# Patient Record
(demographics unavailable — no encounter records)

---

## 2017-11-27 NOTE — XR
EXAMINATION TYPE: XR chest 2V

 

DATE OF EXAM: 11/27/2017

 

COMPARISON: None

 

HISTORY: 48-year-old female with chest pain

 

TECHNIQUE:  PA and lateral views

 

FINDINGS:  

Heart is normal size. Aorta and pulmonary vasculature within normal limits. Peribronchial cuffing is 
noted. Strandy atelectasis in the lower lungs. No consolidation or pleural effusion. ACDF hardware.

 

 

IMPRESSION:  

Mild peribronchial cuffing. Findings may reflect bronchitis or chronic asthma. Strandy atelectasis in
 the lower lungs. Otherwise, no acute process seen.

## 2017-11-27 NOTE — ED
General Adult HPI





- General


Chief complaint: Chest Pain


Stated complaint: chest pain


Time Seen by Provider: 11/27/17 13:55


Source: patient, RN notes reviewed


Mode of arrival: wheelchair


Limitations: no limitations





- History of Present Illness


Initial comments: 





48-year-old female presenting for evaluation of right upper chest pain.  Pain 

is sharp in nature, worse with movement.  Pain has been constant since 

yesterday.  Patient denies vomiting or diaphoresis.  Patient states she does 

have similar pain in her back.  Past medical history of hypertension and 

hypercholesterolemia.  Patient denies shortness of breath or difficulty 

breathing, denies any radiating symptoms.  





- Related Data


 Home Medications











 Medication  Instructions  Recorded  Confirmed


 


Cyanocobalamin [Vitamin B-12] 500 mcg PO DAILY 11/27/17 11/27/17


 


Levothyroxine Sodium [Synthroid] 88 mcg PO DAILY 11/27/17 11/27/17











 Allergies











Allergy/AdvReac Type Severity Reaction Status Date / Time


 


codeine Allergy  Vomiting,ra Verified 11/27/17 14:27





   sh  


 


sulfamethoxazole Allergy  Nausea & Verified 11/27/17 14:27





[From Bactrim]   Vomiting &  





   Diarrhea  


 


trimethoprim [From Bactrim] Allergy  Nausea & Verified 11/27/17 14:27





   Vomiting &  





   Diarrhea  














Review of Systems


ROS Statement: 


Those systems with pertinent positive or pertinent negative responses have been 

documented in the HPI.





ROS Other: All systems not noted in ROS Statement are negative.





Past Medical History


Past Medical History: GERD/Reflux, Thyroid Disorder


Additional Past Medical History / Comment(s): IBS/diverticulitis, fatty liver, 

uterine fibroids, hashimoto's thyroid,


History of Any Multi-Drug Resistant Organisms: MRSA


Date of last positivie culture/infection: 10/17/2013


MDRO Source:: forehead


Past Surgical History: Back Surgery, Breast Surgery, Cholecystectomy, 

Hysterectomy, Tonsillectomy, Tubal Ligation, Uterine Ablation


Additional Past Surgical History / Comment(s): cervical fusion, breast reduction

, fatty tumors removed from axilla/skin.


Past Anesthesia/Blood Transfusion Reactions: Motion Sickness, Postoperative 

Nausea & Vomiting (PONV)


Past Psychological History: No Psychological Hx Reported


Smoking Status: Never smoker


Past Alcohol Use History: None Reported


Past Drug Use History: None Reported





- Past Family History


  ** Mother


Family Medical History: Cancer





General Exam


Limitations: no limitations


General appearance: alert, in no apparent distress


Head exam: Present: atraumatic, normocephalic


Eye exam: Present: normal appearance, PERRL


ENT exam: Present: normal exam


Neck exam: Present: normal inspection.  Absent: tenderness, meningismus


Respiratory exam: Present: normal lung sounds bilaterally.  Absent: respiratory 

distress


Cardiovascular Exam: Present: regular rate, normal rhythm


GI/Abdominal exam: Present: soft.  Absent: distended, tenderness


Extremities exam: Present: normal inspection, normal capillary refill.  Absent: 

pedal edema


Back exam: Present: normal inspection, full ROM


Neurological exam: Present: alert, oriented X3, CN II-XII intact.  Absent: 

motor sensory deficit


Psychiatric exam: Present: normal affect, normal mood


Skin exam: Present: warm, dry, intact.  Absent: cyanosis, diaphoretic





Course


 Vital Signs











  11/27/17 11/27/17 11/27/17





  13:51 14:41 15:11


 


Temperature 98.4 F  


 


Pulse Rate 75 74 66


 


Respiratory 18  





Rate   


 


Blood Pressure 157/85 144/89 147/89


 


O2 Sat by Pulse 99 97 99





Oximetry   














EKG Findings





- EKG Comments:


EKG Findings:: EKG shows normal sinus rhythm, ventricular rate 74, CA interval 

160, QRS duration 98, , no signs of ischemia





Medical Decision Making





- Medical Decision Making





40-year-old female presenting with 2 day history of right upper chest pain.  

Atypical in nature.  Worse with movement.  Pain is reproducible on examination.

  EKG is nonischemic.  Chest x-ray shows no focal pneumonia or acute findings, 

there is mild peribronchial cuffing, patient does admit to having a two-week 

history of minimal cough.  No history of asthma.  Laboratory studies including 

CBC, CMP, cardiac enzymes, and d-dimer are unremarkable.  Patient's chest pain 

is not concerning for cardiac causes.  She will follow-up with her primary care 

physician.





- Lab Data


Result diagrams: 


 11/27/17 14:27





 11/27/17 14:27


 Lab Results











  11/27/17 11/27/17 11/27/17 Range/Units





  14:27 14:27 14:27 


 


WBC   8.2   (3.8-10.6)  k/uL


 


RBC   4.61   (3.80-5.40)  m/uL


 


Hgb   14.5   (11.4-16.0)  gm/dL


 


Hct   43.0   (34.0-46.0)  %


 


MCV   93.3   (80.0-100.0)  fL


 


MCH   31.4   (25.0-35.0)  pg


 


MCHC   33.7   (31.0-37.0)  g/dL


 


RDW   13.7   (11.5-15.5)  %


 


Plt Count   348   (150-450)  k/uL


 


Neutrophils %   59   %


 


Lymphocytes %   32   %


 


Monocytes %   5   %


 


Eosinophils %   2   %


 


Basophils %   1   %


 


Neutrophils #   4.8   (1.3-7.7)  k/uL


 


Lymphocytes #   2.6   (1.0-4.8)  k/uL


 


Monocytes #   0.4   (0-1.0)  k/uL


 


Eosinophils #   0.2   (0-0.7)  k/uL


 


Basophils #   0.1   (0-0.2)  k/uL


 


PT     (9.0-12.0)  sec


 


INR     (<1.2)  


 


APTT     (22.0-30.0)  sec


 


D-Dimer     (<0.60)  mg/L FEU


 


Sodium    139  (137-145)  mmol/L


 


Potassium    4.2  (3.5-5.1)  mmol/L


 


Chloride    106  ()  mmol/L


 


Carbon Dioxide    22  (22-30)  mmol/L


 


Anion Gap    11  mmol/L


 


BUN    15  (7-17)  mg/dL


 


Creatinine    0.69  (0.52-1.04)  mg/dL


 


Est GFR (MDRD) Af Amer    >60  (>60 ml/min/1.73 sqM)  


 


Est GFR (MDRD) Non-Af    >60  (>60 ml/min/1.73 sqM)  


 


Glucose    84  (74-99)  mg/dL


 


Calcium    9.8  (8.4-10.2)  mg/dL


 


Magnesium    1.9  (1.6-2.3)  mg/dL


 


Total Bilirubin    0.4  (0.2-1.3)  mg/dL


 


AST    29  (14-36)  U/L


 


ALT    53 H  (9-52)  U/L


 


Alkaline Phosphatase    79  ()  U/L


 


Total Creatine Kinase  120    ()  U/L


 


CK-MB (CK-2)  1.8    (0.0-2.4)  ng/mL


 


CK-MB (CK-2) Rel Index  1.5    


 


Troponin I  <0.012    (0.000-0.034)  ng/mL


 


NT-Pro-B Natriuret Pep     pg/mL


 


Total Protein    7.7  (6.3-8.2)  g/dL


 


Albumin    4.8  (3.5-5.0)  g/dL














  11/27/17 11/27/17 Range/Units





  14:27 14:27 


 


WBC    (3.8-10.6)  k/uL


 


RBC    (3.80-5.40)  m/uL


 


Hgb    (11.4-16.0)  gm/dL


 


Hct    (34.0-46.0)  %


 


MCV    (80.0-100.0)  fL


 


MCH    (25.0-35.0)  pg


 


MCHC    (31.0-37.0)  g/dL


 


RDW    (11.5-15.5)  %


 


Plt Count    (150-450)  k/uL


 


Neutrophils %    %


 


Lymphocytes %    %


 


Monocytes %    %


 


Eosinophils %    %


 


Basophils %    %


 


Neutrophils #    (1.3-7.7)  k/uL


 


Lymphocytes #    (1.0-4.8)  k/uL


 


Monocytes #    (0-1.0)  k/uL


 


Eosinophils #    (0-0.7)  k/uL


 


Basophils #    (0-0.2)  k/uL


 


PT  10.7   (9.0-12.0)  sec


 


INR  1.1   (<1.2)  


 


APTT  23.4   (22.0-30.0)  sec


 


D-Dimer  0.28   (<0.60)  mg/L FEU


 


Sodium    (137-145)  mmol/L


 


Potassium    (3.5-5.1)  mmol/L


 


Chloride    ()  mmol/L


 


Carbon Dioxide    (22-30)  mmol/L


 


Anion Gap    mmol/L


 


BUN    (7-17)  mg/dL


 


Creatinine    (0.52-1.04)  mg/dL


 


Est GFR (MDRD) Af Amer    (>60 ml/min/1.73 sqM)  


 


Est GFR (MDRD) Non-Af    (>60 ml/min/1.73 sqM)  


 


Glucose    (74-99)  mg/dL


 


Calcium    (8.4-10.2)  mg/dL


 


Magnesium    (1.6-2.3)  mg/dL


 


Total Bilirubin    (0.2-1.3)  mg/dL


 


AST    (14-36)  U/L


 


ALT    (9-52)  U/L


 


Alkaline Phosphatase    ()  U/L


 


Total Creatine Kinase    ()  U/L


 


CK-MB (CK-2)    (0.0-2.4)  ng/mL


 


CK-MB (CK-2) Rel Index    


 


Troponin I    (0.000-0.034)  ng/mL


 


NT-Pro-B Natriuret Pep   28  pg/mL


 


Total Protein    (6.3-8.2)  g/dL


 


Albumin    (3.5-5.0)  g/dL














Disposition


Clinical Impression: 


 Chest pain, Costalchondritis





Disposition: HOME SELF-CARE


Condition: Good


Instructions:  Chest Pain (ED), Costochondritis (ED)


Referrals: 


Arron Hester MD [Primary Care Provider] - 1-2 days


Time of Disposition: 15:58

## 2018-09-07 NOTE — P.GSHP
History of Present Illness


H&P Date: 18





The patient is a 49-year-old white female who presents for evaluation of an 

abnormal mammogram and ultrasound.  Additionally she states that she has felt 

some fullness in her right posterior breast for approximately 6 months.  The 

area is not painful but has increased in size.  She has had prior lipomas 

removed and thought this may represent a lipoma.  On her mammogram which was 

performed on  she was noted to have a 3.8 x 5.3 cm rounded density in 

the posterior described as right axillary region.  This appears to have been 

present previously but has increased in size.  An ultrasound was recommended.  

On ultrasound a discordant cystic lesion was identified which was 0.4 x 0.5 cm 

and attempt a cyst aspiration was suggested.  No reference was made at that 

time to the larger lesion posterior in the breast.  The radiographs have been 

reviewed with Dr. Munguia and it is his feeling that the cystic lesion is benign 

and can be followed with a repeat ultrasound.  However the larger area in the 

posterior breast should have further radiographic evaluation.  It is 

recommended she undergo lateral and spot compression views with an a XCCL 

evaluation of the lesion in the posterior right breast.  The patient denies any 

nipple discharge or skin changes.  The patient has had bilateral cyst 

aspirations in the past.  The patient is also had a bilateral breast reduction 

in .


The patient drinks one cup of coffee/day, she does not drink pop, she is not a 

smoker and does not live with a smoker.  She does not eat large quantities of 

chocolate.


Family history:


1. mother: pancreatic cancer


2. sister: ovarian cancer


3.  maternal grandmother: lung cancer, also uterine cancer


4.  maternal aunt: hodgkins, rectal, cervical and uterine


5.  maternal great aunts and uncles: 16 all 16  of cancer,(liver, brain)








Hormonal History:


menarche: 13


pregnancy: 2, 2 children, breast fed: yes, age at first birth: 19


hysterectomy: at 44 left ovaries, done for bleeding/pain


BCP: 1 year


hormones: no





Past Surgical History:


1.  gallbladder


2.  breast reduction


3.  cervical disc fusion


4. hysterectomy


5.  tubal


6.  tonsillectomy


7.  lipoma's














Past Medical History:


1.  boarderline diabetic


2. HTN


3.  High cholesterol


4.  Diverticular disease


5.  Irritable Bowel syndrome


6.  Vitamin D deficiency





Social HIstory:


smoke: none


alcohol: none


drugs: none











 








- Constitutional


Constitutional: Denies chills, Denies fever





- EENT


Eyes: bilateral blurred vision (itchy eyes, and blurry at times), denies pain


Ears: bilateral: ear discharge (drain at times), tinnitus (occasional)


Ears, nose, mouth and throat: Reports sore throat, Denies headache





- Breasts


Breasts: bilateral: as per HPI





- Cardiovascular


Cardiovascular: Reports high blood pressure





- Respiratory


Respiratory: Denies cough, Denies 7





- Gastrointestinal


Gastrointestinal: Reports as per HPI





- Genitourinary (Female)


Genitourinary: Denies dysuria, Denies hematuria





- Menstruation


Menstruation: Reports post hysterectomy





- Musculoskeletal


Musculoskeletal: Reports myalgias





- Integumentary


Integumentary: Reports rash, Denies pruritus





- Neurological


Neurological: Reports numbness, Denies weakness





- Psychiatric


Psychiatric: Denies anxiety, Denies depression





- Endocrine


Comment: 





hypothyroid, autoimmune deficiency





- Hematologic/Lymphatic


Comment: 





ibuprofen prn





- Allergic/Immunologic


Comment: 





as noted in allergies


Allergic/Immunologic: Reports as per HPI





Past Medical History


Past Medical History: GERD/Reflux, Hyperlipidemia, Hypertension, Thyroid 

Disorder


Additional Past Medical History / Comment(s): IBS/diverticulitis, fatty liver, 

uterine fibroids, hashimoto's thyroid, borderline Type 2 DM,


History of Any Multi-Drug Resistant Organisms: MRSA


Date of last positivie culture/infection: 10/17/2013


MDRO Source:: forehead


Past Surgical History: Back Surgery, Breast Surgery, Cholecystectomy, 

Hysterectomy, Tonsillectomy, Tubal Ligation, Uterine Ablation


Additional Past Surgical History / Comment(s): cervical fusion 2008, 

breast reduction , fatty tumors removed from right axilla, left forearm, 

left anterior upper leg x2, right posterior upper leg.


Past Anesthesia/Blood Transfusion Reactions: Motion Sickness, Postoperative 

Nausea & Vomiting (PONV)


Past Psychological History: No Psychological Hx Reported


Smoking Status: Never smoker


Past Alcohol Use History: None Reported


Past Drug Use History: None Reported





- Past Family History


  ** Sister(s)


Family Medical History: Cancer, Fibromyalgia


Additional Family Medical History / Comment(s): first sister: ovarian cancer 2018.  second sister: fibromyalgia 





  ** Father


Family Medical History: Myocardial Infarction (MI)


Additional Family Medical History / Comment(s):  at age 49





  ** Mother


Family Medical History: Cancer


Additional Family Medical History / Comment(s): pancreatic cancer  at 

age 59





Medications and Allergies


 Home Medications











 Medication  Instructions  Recorded  Confirmed  Type


 


Atorvastatin [Lipitor] 10 mg PO HS 18 History


 


Cyanocobalamin (Vitamin B-12) 5,000 mcg PO DAILY 18 History





[Vitamin B-12]    


 


Hydrochlorothiazide 25 mg PO DAILY 18 History


 


Levothyroxine Sodium [Synthroid] 100 mcg PO DAILY 18 History


 


Lisinopril [Zestril] 5 mg PO DAILY 18 History


 


Potassium Chloride [Klor-Con 20] 20 meq PO DAILY 18 History


 


Ranitidine HCl 300 mg PO HS 18 History











 Allergies











Allergy/AdvReac Type Severity Reaction Status Date / Time


 


codeine Allergy  Vomiting,ra Verified 18 09:10





   sh  


 


Androgenic Anabolic Steroid AdvReac  Nausea & Unverified 18 09:10





   Vomiting,  





   Swelling  


 


sulfamethoxazole AdvReac  Nausea & Verified 18 09:10





[From Bactrim]   Vomiting &  





   Diarrhea  


 


trimethoprim [From Bactrim] AdvReac  Nausea & Verified 18 09:10





   Vomiting &  





   Diarrhea  














Surgical - Exam


 Vital Signs











Temp Pulse Resp BP Pulse Ox


 


 98.7 F   78   14   107/73   96 


 


 18 09:12  18 09:12  18 09:12  18 09:12  18 09:12














- General


obese





- Eyes


normal ocular movement, no icteric





- ENT


no hearing loss, no congestion





- Neck


no masses, trachea midline





- Respiratory


normal respiratory effort, clear to auscultation





- Cardiovascular


Rhythm: regular


Heart Sounds: normal: S1, S2





- Abdomen


Abdomen: soft, non tender, no guarding, no rigid, no rebound





- Neurologic


no disoriented, no combative





- Musculoskeletal


normal gait, normal posture





- Psychiatric


oriented to time, oriented to person, oriented to place, speech is normal, 

memory intact





breast: right breast multipositional exam, there is a fullness in the 12:00 

position of the breast extending laterally, no nipple discharge or changes, the 

patient has well-healed scars from prior breast reduction


Right axilla: Scar from prior biopsy removal of lipoma no adenopathy of concern


Left breast: Multi-positional exam fibrocystic changes, scarring from prior 

breast reduction, no dominant masses or nodules of concern


Left axilla: No adenopathy of concern





Results





Mammogram and ultrasound have been reviewed with Dr. Mireles.  On the mammogram 

there was noted to be a 3.5 x 5.3 cm somewhat rounded density in the posterior 

right breast this has increased in size and ultrasound was performed no lesion 

was seen on ultrasound concordant with this but a small cyst was seen at the 9 o

'clock position of the right breast and attempt a cyst aspiration was 

recommended initially, however after review with Dr. Mireles he felt that the 

cystic lesion was non-worrisome and that this should be followed with a 6 month 

ultrasound, the area of concern in the right breast initially we discussed 

compression views to see if this would splay out.  Upon examination of the 

patient there is a palpable area corresponding to the mammographic abnormality 

which is somewhat diffuse and therefore stereo biopsy will be attempted.





Assessment and Plan


Assessment: 





Impression:


1.  Palpable abnormality right breast


2.  Radiographic abnormality right breast


3.  Ultrasound cystic lesion right breast discordant with that seen on 

mammogram and palpable areas


3.  Status post bilateral breast reduction


4.  Hypertension


5.  High cholesterol


6.  Cervical disc disease


7.  Autoimmune disease


8.  Vitamin D deficiency


9.  Hypothyroidism


10.  Irritable bowel syndrome


11.  History of diverticular disease


12.  Borderline diabetic


13.  Strong family history of cancer


Plan:


1.  Attempted stereotactic biopsy of area of concern in the right breast


2.  Six-month follow-up ultrasound for cystic lesion in the right breast


3.  Have discussed with the patient that caffeine may aggravate fibrocystic 

disease and she is going to stop her coffee


4.  Medical management of medical problems


5.  Consider genetic consultation secondary to strong family history of cancer, 

patient will first get the area of concern in the right breast resolved


CC: Dr. Molina, Dr. Guallpa

## 2018-09-27 NOTE — MM
"              After Visit Summary   5/5/2018    Darlin Quiroz    MRN: [id number]           Patient Information     Date Of Birth          1967        Visit Information        Provider Department      5/5/2018 6:40 PM Sophy Shi DO [facility]        Today's Diagnoses     Respiratory infection    -  1    Throat pain        Strep throat exposure        Urinary incontinence, unspecified type          Care Instructions    If any fevers (a temperature of 100.5 or above) persists after 48 hours on the antibiotic, see your primary provider.             Follow-ups after your visit        Who to contact     If you have questions or need follow up information about today's clinic visit or your schedule please contact [facility] directly at [phone].  Normal or non-critical lab and imaging results will be communicated to you by [employer], letter or phone within 4 business days after the clinic has received the results. If you do not hear from us within 7 days, please contact the clinic through [employer] or phone. If you have a critical or abnormal lab result, we will notify you by phone as soon as possible.  Submit refill requests through [employer] or call your pharmacy and they will forward the refill request to us. Please allow 3 business days for your refill to be completed.          Additional Information About Your Visit        MyChart Information     [employer] lets you send messages to your doctor, view your test results, renew your prescriptions, schedule appointments and more. To sign up, go to [email or web address] . Click on \"Log in\" on the left side of the screen, which will take you to the Welcome page. Then click on \"Sign up Now\" on the right side of the page.     You will be asked to enter the access code listed below, as well as some personal information. Please follow the directions to create your username and password.     Your access code is: " Reason for exam: additional evaluation requested from prior study.

Last mammogram was performed 1 month ago.



MG 3D Diag Mammo W/Cad RT

Spot compression CC, spot compression MLO, LM, and XCCL view(s) were taken of 
the 

right breast.

Prior study comparison: August 31, 2018, mammogram.  August 27, 2018, 
mammogram.  

May 24, 2017, mammogram.

There are scattered fibroglandular densities.  Post mammoplasty changes are 

present. Patient came with outside imaging from [facility] where 

ultrasound demonstrated a5mm probable cyst at 9 o'clock for which aspiration 
was 

suggested. The case was subsequently reviewed with the surgeon and it was 
determined that this does not 

correspond to the upper outer quadrant global asymmetry. 3D lateral shows 
possible

9mm nodularity superimposed, not seen on MLO.



These results were verbally communicated with the patient and result sheet 
given 

to the patient on 9/27/18.





ASSESSMENT: Incomplete: need additional imaging evaluation, BI-RAD 0



RECOMMENDATION:

Ultrasound of the right breast.

(9-3 o'clock)

GARIMA "[email or web address]  Expires: 8/3/2018  7:59 PM     Your access code will  in 90 days. If you need help or a new code, please call your [place] clinic or [phone].        Care EveryWhere ID     This is your Care EveryWhere ID. This could be used by other organizations to access your [place] medical records  [id number]        Your Vitals Were     Pulse Temperature Height Last Period Pulse Oximetry BMI (Body Mass Index)    117 103.2  F (39.6  C) (Oral) 5' 3.5\" (1.613 m) 04/10/2017 (Exact Date) 98% 33.83 kg/m2       Blood Pressure from Last 3 Encounters:   18 (!) 162/96    Weight from Last 3 Encounters:   18 194 lb (88 kg)              We Performed the Following     *UA reflex to Microscopic and Culture ([place] and [facility] (except [place] and [place])     Beta strep group A culture     CBC with platelets and differential     Influenza A/B antigen     Strep, Rapid Screen     Urine Microscopic          Today's Medication Changes          These changes are accurate as of 18  7:59 PM.  If you have any questions, ask your nurse or doctor.               Start taking these medicines.        Dose/Directions    azithromycin 250 MG tablet   Commonly known as:  ZITHROMAX   Used for:  Respiratory infection   Started by:  Sophy Shi, DO        Two tablets first day, then one tablet daily for four days.   Quantity:  6 tablet   Refills:  0            Where to get your medicines      These medications were sent to [id number] - [place], [place] - [address] AT [facility] [address]  [facility] [address], [place] [address]    Hours:  24-hours Phone:  [phone]     azithromycin 250 MG tablet                Primary Care Provider Fax #    Physician No Ref-Primary [phone]       No address on file        Equal Access to Services     RAFIQ AREVALO AH: Shaina Dyer, waaxda luqadaha, qaybta kaalmada adeegyada, cory pinedo " ah. So [facility] [phone].    ATENCIÓN: Si habla delores, tiene a sullivan disposición servicios gratuitos de asistencia lingüística. Filomena al [phone].    We comply with applicable federal civil rights laws and [place] laws. We do not discriminate on the basis of race, color, national origin, age, disability, sex, sexual orientation, or gender identity.            Thank you!     Thank you for choosing [facility]  for your care. Our goal is always to provide you with excellent care. Hearing back from our patients is one way we can continue to improve our services. Please take a few minutes to complete the written survey that you may receive in the mail after your visit with us. Thank you!             Your Updated Medication List - Protect others around you: Learn how to safely use, store and throw away your medicines at www.disposemymeds.org.          This list is accurate as of 5/5/18  7:59 PM.  Always use your most recent med list.                   Brand Name Dispense Instructions for use Diagnosis    azithromycin 250 MG tablet    ZITHROMAX    6 tablet    Two tablets first day, then one tablet daily for four days.    Respiratory infection       IBUPROFEN PO           multivitamin, therapeutic with minerals Tabs tablet      Take 1 tablet by mouth daily        omega-3 acid ethyl esters 1 g capsule    Lovaza     Take 2 g by mouth 2 times daily

## 2018-09-27 NOTE — USB
Reason for exam: additional evaluation requested from abnormal screening.



US Breast Limited RT

Right limited breast ultrasound including focal area of concern, retroareolar 
and 

axilla demonstrates: 



a 1.08 x 0.5 x 0.9cm mixed lesion at 9 o'clock, 

a 2.0 x 1.1 x 1.5cm mixed lesion at 9 o'clock,

a 1.7 x 0.9 x 1.4cm mixed lesion at 10 o'clock.



 the multiple mixed areas at 9 and 10 o'clock appear to represent islands of 
dense tissue rather than masses, 

 ducts at the nipple and 



a 0.4 x 0.4 x 0.5cm cystic lesion at 9 o'clock this was seen on the outside 
ultrasound and is 

benign.  There is no need to pursue US guided aspiration/biopsy for this lesion 
as was recommended on outside US, 



a 0.3 x 0.2cm lesion too small to characterize irregular lesion for which a 6 
month follow up is 

recommended.



These results were verbally communicated with the patient and result sheet 
given 

to the patient on 9/27/18.





ASSESSMENT: Probably benign, BI-RAD 3



RECOMMENDATION:

1. Follow-up diagnostic mammogram and ultrasound of the right breast in 6 
months.

(attention ultrasound 3 o'clock, 9 o'clock and 10 o'clock).

2. Patient should continue monthly self breast exam.

3. This exam should not preclude additional followup of suspicious palpable 
abnormalities.

[facility]

## 2018-10-05 NOTE — P.PCN
Date of Procedure: 10/05/18


Preoperative Diagnosis: 


Right breast fullness, present for approximately a year new change, not seen 

radiographically


Postoperative Diagnosis: 


same


Procedure(s) Performed: 


Core biopsy area of concern right breast


Anesthesia: local


Surgeon: Monica Oviedo


Estimated Blood Loss (ml): 1


Pathology: other (breast tissue)


Condition: stable


Disposition: same day


Indications for Procedure: 


The patient is a 49-year-old white female with an area of increased nodularity 

in the upper area of the right breast.  This has changed over the last year.  

This was not seen radiographically.


Description of Procedure: 


The patient is a 49-year-old white female with a area of increased nodularity 

noted in her right breast in the upper 2 upper outer quadrant area.  This was 

not seen radiographically but is palpable.  After discussion we have opted to 

do a core biopsy of the area of concern to rule out any atypia which would 

necessitate further operative intervention.


The area of concern in the right breast was prepped using Betadine.  1% 

lidocaine was used to anesthetize the area where the needle would be inserted.  

A small 15 blade was then used to make a small skin incision.  An 18-gauge Bard 

core biopsy needle was used to obtain 3 core biopsies from the area of concern.

  A sterile dressing was applied to the area.  The specimen was sent for 

pathologic evaluation.  The patient will follow up next week for results of the 

biopsy.  If the biopsy does not show any atypia we will repeat radiographs of 

the right breast in 6 months time with close surveillance.  If any atypia as 

noted we will proceed with open biopsy.


The patient tolerated the procedure in stable condition.


Cc: Dr. Bowling

## 2018-10-05 NOTE — P.PN
Progress Note - Text


Progress Note Date: 10/05/18





The patient is a 49-year-old white female who initially presented for 

evaluation of an abnormal mammogram and ultrasound.  She was had noticed some 

fullness in her right posterior breast for approximately 6 months.  The area 

was not painful but had increased in size.  She has had prior lipomas removed 

and she thought this may represent a lipoma.  On a mammogram at that time she 

was noted to have a 3.8 x 5.3 cm rounded density in the posterior described as 

right axillary region.  This appeared to have been present previously but had 

increased in size.  The patient subsequently had her x-rays reviewed and there 

was question that we might be able to approach this through a stereotactic 

approach or that this may indeed be just denser fibroglandular tissue which 

could be followed conservatively.  Repeat mammogram and ultrasound on [phone] 

were performed of this area.  On ultrasound to have #1 and 1.8 cm mixed lesion 

at 9:00 #2 with 2 cm mixed lesion at 9:00 #31.7 cm mixed lesion at 10:00 these 

were felt to be most likely benign and repeat mammogram and ultrasound in 6 

months time was recommended.  The mammogram of that same date revealed 

scattered fibroglandular densities.  They did see changes post mammoplasty 

changes present.  The patient is status post bilateral mammoplasty in 2007.  

These were done to reduce the size of her breast.  The patient was a 36 triple 

D and she is now a 38 D.  





Physical examination:


Right breast: Increased fullness in the 12 o'clock position this is persistent


Lungs: Clear


Heart: Regular rate and rhythm


Abdomen: Soft


Impression: Persistent fullness 12:00 region of the right breast not seen well 

radiographically but palpable


Plan:


1.  Core biopsy of area of concern depending on this further recommendation to 

follow


CC: Dr. Molina